# Patient Record
Sex: FEMALE | Race: BLACK OR AFRICAN AMERICAN | Employment: FULL TIME | URBAN - METROPOLITAN AREA
[De-identification: names, ages, dates, MRNs, and addresses within clinical notes are randomized per-mention and may not be internally consistent; named-entity substitution may affect disease eponyms.]

---

## 2021-05-30 ENCOUNTER — RECORDS - HEALTHEAST (OUTPATIENT)
Dept: ADMINISTRATIVE | Facility: CLINIC | Age: 36
End: 2021-05-30

## 2021-11-08 ENCOUNTER — TELEPHONE (OUTPATIENT)
Dept: EMERGENCY MEDICINE | Facility: CLINIC | Age: 36
End: 2021-11-08

## 2021-11-08 ENCOUNTER — HOSPITAL ENCOUNTER (EMERGENCY)
Facility: CLINIC | Age: 36
Discharge: HOME | End: 2021-11-08
Attending: EMERGENCY MEDICINE | Admitting: EMERGENCY MEDICINE
Payer: COMMERCIAL

## 2021-11-08 VITALS
OXYGEN SATURATION: 100 % | DIASTOLIC BLOOD PRESSURE: 88 MMHG | HEART RATE: 67 BPM | RESPIRATION RATE: 16 BRPM | TEMPERATURE: 96.8 F | SYSTOLIC BLOOD PRESSURE: 134 MMHG | WEIGHT: 195 LBS

## 2021-11-08 DIAGNOSIS — U07.1 ENCOUNTER BY TELEHEALTH FOR CONFIRMED SEVERE ACUTE RESPIRATORY SYNDROME CORONAVIRUS 2 (SARS-COV-2) INFECTION: ICD-10-CM

## 2021-11-08 DIAGNOSIS — T19.2XXA VAGINAL FOREIGN BODY, INITIAL ENCOUNTER: ICD-10-CM

## 2021-11-08 DIAGNOSIS — N93.9 VAGINAL BLEEDING: ICD-10-CM

## 2021-11-08 LAB
ALBUMIN UR-MCNC: 50 MG/DL
AMORPH CRY #/AREA URNS HPF: ABNORMAL /HPF
APPEARANCE UR: CLEAR
BILIRUB UR QL STRIP: NEGATIVE
CLUE CELLS: NORMAL
COLOR UR AUTO: ABNORMAL
FLUAV RNA SPEC QL NAA+PROBE: NEGATIVE
FLUBV RNA RESP QL NAA+PROBE: NEGATIVE
GLUCOSE UR STRIP-MCNC: NEGATIVE MG/DL
HCG UR QL: NEGATIVE
HGB UR QL STRIP: ABNORMAL
KETONES UR STRIP-MCNC: ABNORMAL MG/DL
LEUKOCYTE ESTERASE UR QL STRIP: NEGATIVE
NITRATE UR QL: NEGATIVE
PH UR STRIP: 6.5 [PH] (ref 5–7)
RBC URINE: 7 /HPF
RSV RNA SPEC NAA+PROBE: NEGATIVE
SARS-COV-2 RNA RESP QL NAA+PROBE: POSITIVE
SP GR UR STRIP: 1.03 (ref 1–1.03)
SQUAMOUS EPITHELIAL: 6 /HPF
TRICHOMONAS, WET PREP: NORMAL
UROBILINOGEN UR STRIP-MCNC: NORMAL MG/DL
WBC URINE: 3 /HPF
WBC'S/HIGH POWER FIELD, WET PREP: NORMAL
YEAST, WET PREP: NORMAL

## 2021-11-08 PROCEDURE — 87637 SARSCOV2&INF A&B&RSV AMP PRB: CPT | Performed by: EMERGENCY MEDICINE

## 2021-11-08 PROCEDURE — 81015 MICROSCOPIC EXAM OF URINE: CPT | Performed by: EMERGENCY MEDICINE

## 2021-11-08 PROCEDURE — 87210 SMEAR WET MOUNT SALINE/INK: CPT | Performed by: EMERGENCY MEDICINE

## 2021-11-08 PROCEDURE — 99284 EMERGENCY DEPT VISIT MOD MDM: CPT | Performed by: EMERGENCY MEDICINE

## 2021-11-08 PROCEDURE — 87491 CHLMYD TRACH DNA AMP PROBE: CPT | Performed by: EMERGENCY MEDICINE

## 2021-11-08 PROCEDURE — 87591 N.GONORRHOEAE DNA AMP PROB: CPT | Performed by: EMERGENCY MEDICINE

## 2021-11-08 PROCEDURE — C9803 HOPD COVID-19 SPEC COLLECT: HCPCS

## 2021-11-08 PROCEDURE — 99284 EMERGENCY DEPT VISIT MOD MDM: CPT

## 2021-11-08 PROCEDURE — 81025 URINE PREGNANCY TEST: CPT | Performed by: EMERGENCY MEDICINE

## 2021-11-08 ASSESSMENT — ENCOUNTER SYMPTOMS
DYSURIA: 0
ABDOMINAL PAIN: 1
FEVER: 0
CHILLS: 0

## 2021-11-08 NOTE — LETTER
November 9, 2021      Aby Carmona  [address] [facility] [address]          SARS CoV2 PCR (no units)   Date Value   11/08/2021 Positive (A)       This letter provides a written record that you were tested for COVID-19. Your result was positive. This means you have COVID-19 (coronavirus).    How can I protect others?If you have symptoms, stay home and away from others (self-isolate) until:You ve had no fever--and no medicine that reduces fever--for 1 full day (24 hours). And      Your other symptoms have gotten better. For example, your cough or breathing has improved. And     At least 10 days have passed since your symptoms started. (If you've been told by a doctor that you have a weak immune system, wait 20 days).    If you don t have symptoms: Stay home and away from others (self-isolate) until at least 10 days have passed since your first positive COVID-19 test. If you have a weak immune system, please self-isolate for 20 days.    During this time:    Stay in your own room, including for meals. Use your own bathroom if you can.    Stay away from others in your home. No hugging, kissing or shaking hands. No visitors.     Don t go to work, school or anywhere else.     Clean  high touch  surfaces often (doorknobs, counters, handles, etc.). Use a household cleaning spray or wipes. You ll find a full list on the EPA website at www.epa.gov/pesticide-registration/list-n-disinfectants-use-against-sars-cov-2.     Cover your mouth and nose with a mask or other face covering to avoid spreading germs.    Wash your hands and face often with soap and water.    Make a list of people you have been in close contact with recently, even if either of you wore a face covering.  o Start your list from 2 days before you became ill or had a positive test.  o Include anyone that was within 6 feet of you for a cumulative total of 15 minutes or more in 24 hours. (Example: if you sat next to Jatin for 5 minutes in the  morning and 10 minutes in the afternoon, then you were in close contact for 15 minutes total that day. Jatin would be added to your list.)        A public health worker will call or text you. It is important that you answer. They will ask you questions about possible exposures to COVID-19, such as people you have been in direct contact with and places you have visited.    Tell the people on your list that you have COVID-19; they should stay away from others for 14 days starting form the last time they were in contact with you (unless you are told something different from a public health worker).    Caregivers in these groups are at risk for severe illness due to COVID-19:  o People 65 years and older  o People who live in a nursing home or long-term care facility  o People with chronic disease (lung, heart, cancer, diabetes, kidney, liver, immunologic)  o People who have a weakened immune system, including those who:  - Are in cancer treatment  - Take medicine that weakens the immune system, such as corticosteroids  - Had a bone marrow or organ transplant  - Have an immune deficiency  - Have poorly controlled HIV or AIDS  - Are obese (body mass index of 40 or higher)  - Smoke regularly    Caregivers should wear gloves while washing dishes, handling laundry and cleaning bedrooms and bathrooms.    Wash and dry laundry with special caution. Don t shake dirty laundry, and use the warmest water setting you can.    If you have a weakened immune system, ask your doctor about other actions you should take.    For more tips, go to www.cdc.gov/coronavirus/2019-ncov/downloads/10Things.pdf.    You should not go back to work until you meet the guidelines above for ending your home isolation. You don't need to be retested for COVID-19 before going back to work- studies show that you won't spread the virus if it's been at least 10 days since your symptoms started (or 20 days, if you have a weak immune system).    Employers: This  document serves as formal notice of your employee s medical guidelines for going back to work. They must meet the above guidelines before going back to work in person.    How can I take care of myself?    1. Get lots of rest. Drink extra fluids (unless a doctor has told you not to).    2. Take Tylenol (acetaminophen) for fever or pain. If you have liver or kidney problems, ask your family doctor if it s okay to take Tylenol.     Take either:     650 mg (two 325 mg pills) every 4 to 6 hours, or     1,000 mg (two 500 mg pills) every 8 hours as needed.     Note: Don t take more than 3,000 mg in one day. Acetaminophen is found in many medicines (both prescribed and over-the-counter medicines). Read all labels to be sure you don t take too much.    For children, check the Tylenol bottle for the right dose (based on their age or weight).    3. If you have other health problems (like cancer, heart failure, an organ transplant or severe kidney disease): Call your specialty clinic if you don t feel better in the next 2 days.    4. Know when to call 911: Emergency warning signs include:    Trouble breathing or shortness of breath    Pain or pressure in the chest that doesn t go away    Feeling confused like you haven t felt before, or not being able to wake up    Bluish-colored lips or face    5. Sign up for [employer]. We know it s scary to hear that you have COVID-19. We want to track your symptoms to make sure you re okay over the next 2 weeks. Please look for an email from [employer]--this is a free, online program that we ll use to keep in touch. To sign up, follow the link in the email. Learn more at [email or web address].      Where can I get more information?     [facility]: [email or web address]    Coronavirus Basics: www.health.[email or web address]/diseases/coronavirus/basics.html    What to Do If You re Sick: www.cdc.gov/coronavirus/2019-ncov/about/steps-when-sick.html    Ending Home Isolation:  www.cdc.gov/coronavirus/2019-ncov/hcp/disposition-in-home-patients.html     Caring for Someone with COVID-19: www.cdc.gov/coronavirus/2019-ncov/if-you-are-sick/care-for-someone.html     [facility] clinical trials (COVID-19 research studies): [email or web address]-clinical-trials

## 2021-11-08 NOTE — DISCHARGE INSTRUCTIONS
Thank you for choosing [facility] for your care. It was a pleasure taking care of you today in our Emergency Department.     Please follow up with your OB provider in the next 2-3 days. However, if you have continued worsening symptoms such as increased bleeding, fever, worsening pain, please return to the emergency department.

## 2021-11-08 NOTE — ED TRIAGE NOTES
Patient is reporting having pelvic after having sex and started to have pain on Saturday and she noted today that the condom had been left behind which did com out today.  The patient noted that she was spotting Sunday and today and once the condom was out the spotting stopped.

## 2021-11-08 NOTE — ED PROVIDER NOTES
[place] EMERGENCY DEPARTMENT ([facility])  11/08/21   History     Chief Complaint   Patient presents with     Abdominal Pain     The history is provided by the patient and medical records.     Aby Carmona is a 36 year old female with history of GERD, prior H. pylori infection who presents with lower abdominal pain.  This pain radiates to her back.  She reports having pelvic pain after intercourse on Saturday.  She notes that the condom had been left behind but it came out today but she still endorses some pelvic pain.  But denies abdominal pain.  Denies dysuria, diarrhea, nausea, vomiting, fevers, or any other concerns.  She denies any pain with urination.  She reports that she only has 1 sexual partner.     She reports having a cold last Monday but she feels today that she is almost over it.  She is fully Covid vaccinated on 1/26/21.  She has not had any recent Covid test.  She denies any new medical problems.  She states that she is only on her anxiety medications.  She states that she does not have any allergies.  She denies any other discharge.  She denies any fever or chills.     Past Medical History  No past medical history on file.  No past surgical history on file.  No current outpatient medications on file.    No Known Allergies  Family History  No family history on file.  Social History   Social History     Tobacco Use     Smoking status: Not on file     Smokeless tobacco: Not on file   Substance Use Topics     Alcohol use: Not on file     Drug use: Not on file      Past medical history, past surgical history, medications, allergies, family history, and social history were reviewed with the patient. No additional pertinent items.       Review of Systems   Constitutional: Negative for chills and fever.   Gastrointestinal: Positive for abdominal pain.   Genitourinary: Negative for dysuria.   All other systems reviewed and are negative.    A complete review of systems was performed with  pertinent positives and negatives noted in the HPI, and all other systems negative.    Physical Exam   BP: 134/88  Pulse: 67  Temp: 96.8  F (36  C)  Resp: 16  Weight: 88.5 kg (195 lb)  SpO2: 100 %  Physical Exam  General: Alert, lying on the bed in NAD  Neuro: Awake alert; moving extremities x 4 face symterical  Eyes: sclera nonicteric conjunctiva clear  Mouth: mmm  Heart: RRR  Lungs:  CTA bilaterally  Abdomen:  soft  non tenderness to palpation no rebound; no guarding +BS  Pelvic: Normal appearing vaginal mucosa; small amount of blood in the vaginal vault; no cervical motion tenderness; no adenexal tenderness to palpation  Back: no CVA tenderness  Extremities: no pedal edema        ED Course     3:13 PM  The patient was seen and examined by Sandra Correa MD, in Room VTB.     Patient had absolutely no abdominal tenderness on exam, and no tenderness on pelvic exam.  No concerning discharge but had minimal blood present.  I did obtain chlamydia and gonorrhea swabs, and sent them for PCR.  Also wet prep, wet prep was unremarkable.  Urinalysis was unremarkable as well.  Patient will be contacted if chlamydia or gonorrhea come back positive, we discussed risks and benefits and not starting antibiotics at this time for abdominal pathology.     In regards to patient's cold symptoms, we did discuss risks and benefits and recommended the Covid test at this time.  Patient's Covid test came back positive.  I called patient with the results and answer any questions she had regarding return to work, quarantine, and follow-up with her friends who also having symptoms.  Patient is in agreement with plan.    Patient discharged in good condition with questions answered. She was given [facility] discharge instructions and follow-up recommendations. Patient will return to the ED if symptoms worsen or she develops any of the concerning signs/symptoms as outlined in the EPIC d/c instructions. Otherwise she will follow up in clinic as  recommended in the d/c instructions.            Results for orders placed or performed during the hospital encounter of 11/08/21   UA with Microscopic reflex to Culture     Status: Abnormal    Specimen: Urine, Midstream   Result Value Ref Range    Color Urine Orange (A) Colorless, Straw, Light Yellow, Yellow    Appearance Urine Clear Clear    Glucose Urine Negative Negative mg/dL    Bilirubin Urine Negative Negative    Ketones Urine Trace (A) Negative mg/dL    Specific Gravity Urine 1.035 1.003 - 1.035    Blood Urine Large (A) Negative    pH Urine 6.5 5.0 - 7.0    Protein Albumin Urine 50  (A) Negative mg/dL    Urobilinogen Urine Normal Normal, 2.0 mg/dL    Nitrite Urine Negative Negative    Leukocyte Esterase Urine Negative Negative    Amorphous Crystals Urine Few (A) None Seen /HPF    RBC Urine 7 (H) <=2 /HPF    WBC Urine 3 <=5 /HPF    Squamous Epithelials Urine 6 (H) <=1 /HPF    Narrative    Urine Culture not indicated   HCG qualitative urine     Status: Normal   Result Value Ref Range    hCG Urine Qualitative Negative Negative   Symptomatic Influenza A/B & SARS-CoV2 (COVID-19) Virus PCR Multiplex Nasopharyngeal     Status: Abnormal    Specimen: Nasopharyngeal; Swab   Result Value Ref Range    Influenza A target Negative Negative    Influenza B target Negative Negative    RSV target Negative Negative    SARS CoV2 PCR Positive (A) Negative    Narrative    Testing was performed using the Xpert Xpress CoV2/Flu/RSV Assay on the Cepheid GeneXpert Instrument. This test should be ordered for the detection of SARS-CoV-2 and influenza viruses in individuals who meet clinical and/or epidemiological criteria. Test performance is unknown in asymptomatic patients. This test is for in vitro diagnostic use under the FDA EUA for laboratories certified under CLIA to perform high or moderate complexity testing. This test has not been FDA cleared or approved. A negative result does not rule out the presence of PCR inhibitors in the  specimen or target RNA in concentration below the limit of detection for the assay. If only one viral target is positive but coinfection with multiple targets is suspected, the sample should be re-tested with another FDA cleared, approved, or authorized test, if coinfection would change clinical management. This test was validated by the [facility]. These laboratories are certified under the Clinical  Laboratory Improvement Amendments of 1988 (CLIA-88) as qualified to perform high complexity laboratory testing.   Wet prep     Status: Normal    Specimen: Vagina; Swab   Result Value Ref Range    Trichomonas Absent Absent    Yeast Absent Absent    Clue Cells Absent Absent    WBCs/high power field None None     Medications - No data to display     Assessments & Plan (with Medical Decision Making)   1. (T19.2XXA) Vaginal foreign body, initial encounter  2. (N93.9) Vaginal bleeding  3. COVID +  Discharge to home with close follow up in Primary care clinic  Return to the ER with any worsening symptoms      I have reviewed the nursing notes. I have reviewed the findings, diagnosis, plan and need for follow up with the patient.    There are no discharge medications for this patient.      Final diagnoses:   Vaginal foreign body, initial encounter   Vaginal bleeding     IBonny, am serving as a trained medical scribe to document services personally performed by Sandra Correa MD based on the provider's statements to me on November 8, 2021.  This document has been checked and approved by the attending provider.    ISandra MD, was physically present and have reviewed and verified the accuracy of this note documented by Bonny Murray, medical scribe.      Sandra Correa MD      --    [facility] EMERGENCY DEPARTMENT  11/8/2021     Sandra Correa MD  11/08/21 2015

## 2021-11-09 LAB
C TRACH DNA SPEC QL NAA+PROBE: NEGATIVE
N GONORRHOEA DNA SPEC QL NAA+PROBE: NEGATIVE

## 2021-11-09 NOTE — RESULT ENCOUNTER NOTE
Final result for both N. Gonorrhoeae PCR and Chlamydia Trachomatis PCR are NEGATIVE.  No treatment or change in treatment per [facility] ED Lab Result N. Gonorrhea AND/OR Chlamydia T. protocol.

## 2021-11-09 NOTE — TELEPHONE ENCOUNTER
"-Coronavirus (COVID-19) Notification    Pt acknowleded it may take 7-10d to receive copy of covid results in the mail. Verfied address.      Caller Name (Patient, parent, daughter/son, grandparent, etc)  Pt    Reason for call  Notify of Positive Coronavirus (COVID-19) lab results, assess symptoms,  review [facility] recommendations    Lab Result    Lab test:  2019-nCoV rRt-PCR or SARS-CoV-2 PCR    Oropharyngeal AND/OR nasopharyngeal swabs is POSITIVE for 2019-nCoV RNA/SARS-COV-2 PCR (COVID-19 virus)    RN Recommendations/Instructions per [facility] Coronavirus COVID-19 recommendations    Brief introduction script  Introduce self then review script:  \"I am calling on behalf of [employer].  We were notified that your Coronavirus test (COVID-19) for was POSITIVE for the virus.  I have some information to relay to you but first I wanted to mention that the [place] Dept of Health will be contacting you shortly [it's possible MD already called Patient] to talk to you more about how you are feeling and other people you have had contact with who might now also have the virus.  Also, [facility] is Partnering with the [facility] for Covid-19 research, you may be contacted directly by research staff.\"    Assessment (Inquire about Patient's current symptoms)   Assessment   Current Symptoms at time of phone call: (if no symptoms, document No symptoms] none   Symptoms onset (if applicable) 11/1     If at time of call, Patients symptoms hare worsened, the Patient should contact 911 or have someone drive them to Emergency Dept promptly:      If Patient calling 911, inform 911 personal that you have tested positive for the Coronavirus (COVID-19).  Place mask on and await 911 to arrive.    If Emergency Dept, If possible, please have another adult drive you to the Emergency Dept but you need to wear mask when in contact with other people.      Monoclonal Antibody Administration    You may be eligible to " "receive a new treatment with a monoclonal antibody for preventing hospitalization in patients at high risk for complications from COVID-19.   This medication is still experimental and available on a limited basis; it is given through an IV and must be given at an infusion center. Please note that not all people who are eligible will receive the medication since it is in limited supply.     Are you interested in being considered for this medication?  No.   Does the patient fit the criteria: No    If patient qualifies based on above criteria:  \"You will be contacted if you are selected to receive this treatment in the next 1-2 business days.   This is time sensitive and if you are not selected in the next 1-2 business days, you will not receive the medication.  If you do not receive a call to schedule, you have not been selected.\"      Review information with Patient    Your result was positive. This means you have COVID-19 (coronavirus).  We have sent you a letter that reviews the information that I'll be reviewing with you now.    How can I protect others?    If you have symptoms: stay home and away from others (self-isolate) until:    You've had no fever--and no medicine that reduces fever--for 1 full day (24 hours). And       Your other symptoms have gotten better. For example, your cough or breathing has improved. And     At least 10 days have passed since your symptoms started. (If you've been told by a doctor that you have a weak immune system, wait 20 days.)     If you don't have symptoms: Stay home and away from others (self-isolate) until at least 10 days have passed since your first positive COVID-19 test. (Date test collected)    During this time:    Stay in your own room, including for meals. Use your own bathroom if you can.    Stay away from others in your home. No hugging, kissing or shaking hands. No visitors.     Don't go to work, school or anywhere else.     Clean  high touch  surfaces often " (doorknobs, counters, handles, etc.). Use a household cleaning spray or wipes. You'll find a full list on the EPA website at www.epa.gov/pesticide-registration/list-n-disinfectants-use-against-sars-cov-2.     Cover your mouth and nose with a mask, tissue or other face covering to avoid spreading germs.    Wash your hands and face often with soap and water.    Make a list of people you have been in close contact with recently, even if either of you wore a face covering.   ; Start your list from 2 days before you became ill or had a positive test.  ; Include anyone that was within 6 feet of you for a cumulative total of 15 minutes or more in 24 hours. (Example: if you sat next to Jatin for 5 minutes in the morning and 10 minutes in the afternoon, then you were in close contact for 15 minutes total that day. Jatin would be added to your list.)    A public health worker will call or text you. It is important that you answer. They will ask you questions about possible exposures to COVID-19, such as people you have been in direct contact with and places you have visited.    Tell the people on your list that you have COVID-19; they should stay away from others for 14 days starting from the last time they were in contact with you (unless you are told something different from a public health worker).     Caregivers in these groups are at risk for severe illness due to COVID-19:  o People 65 years and older  o People who live in a nursing home or long-term care facility  o People with chronic disease (lung, heart, cancer, diabetes, kidney, liver, immunologic)  o People who have a weakened immune system, including those who:  - Are in cancer treatment  - Take medicine that weakens the immune system, such as corticosteroids  - Had a bone marrow or organ transplant  - Have an immune deficiency  - Have poorly controlled HIV or AIDS  - Are obese (body mass index of 40 or higher)  - Smoke regularly    Caregivers should wear gloves  while washing dishes, handling laundry and cleaning bedrooms and bathrooms.    Wash and dry laundry with special caution. Don't shake dirty laundry, and use the warmest water setting you can.    If you have a weakened immune system, ask your doctor about other actions you should take.    For more tips, go to www.cdc.gov/coronavirus/2019-ncov/downloads/10Things.pdf.    You should not go back to work until you meet the guidelines above for ending your home isolation. You don't need to be retested for COVID-19 before going back to work--studies show that you won't spread the virus if it's been at least 10 days since your symptoms started (or 20 days, if you have a weak immune system).    Employers: This document serves as formal notice of your employee's medical guidelines for going back to work. They must meet the above guidelines before going back to work in person.    How can I take care of myself?    1. Get lots of rest. Drink extra fluids (unless a doctor has told you not to).    2. Take Tylenol (acetaminophen) for fever or pain. If you have liver or kidney problems, ask your family doctor if it's okay to take Tylenol.     Take either:     650 mg (two 325 mg pills) every 4 to 6 hours, or     1,000 mg (two 500 mg pills) every 8 hours as needed.     Note: Don't take more than 3,000 mg in one day. Acetaminophen is found in many medicines (both prescribed and over-the-counter medicines). Read all labels to be sure you don't take too much.    For children, check the Tylenol bottle for the right dose (based on their age or weight).    3. If you have other health problems (like cancer, heart failure, an organ transplant or severe kidney disease): Call your specialty clinic if you don't feel better in the next 2 days.    4. Know when to call 911: Emergency warning signs include:    Trouble breathing or shortness of breath    Pain or pressure in the chest that doesn't go away    Feeling confused like you haven't felt  before, or not being able to wake up    Bluish-colored lips or face    5. Sign up for [employer]. We know it's scary to hear that you have COVID-19. We want to track your symptoms to make sure you're okay over the next 2 weeks. Please look for an email from [employer]--this is a free, online program that we'll use to keep in touch. To sign up, follow the link in the email. Learn more at [email or web address].    Where can I get more information?    [facility]: [email or web address]    Coronavirus Basics: www.health.[facility].[place]/diseases/coronavirus/basics.html    What to Do If You're Sick: www.cdc.gov/coronavirus/2019-ncov/about/steps-when-sick.html    Ending Home Isolation: www.cdc.gov/coronavirus/2019-ncov/hcp/disposition-in-home-patients.html     Caring for Someone with COVID-19: www.cdc.gov/coronavirus/2019-ncov/if-you-are-sick/care-for-someone.html     [facility] clinical trials (COVID-19 research studies): [email or web address].[facility]-clinical-trials     A Positive COVID-19 letter will be sent via [employer] or the mail. (Exception, no letters sent to Presurgerical/Preprocedure Patients)    Stephanie Cummings

## 2021-11-09 NOTE — RESULT ENCOUNTER NOTE
Negative for Influenza A, Influenza B, and RSV.  Positive Covid19 result, the resuls is managed for the LPN Support Team and the patient will be notified of their positive Covid19 result via [employer] (if active) or they will be contacted by the nurse via phone call if not active on Mychart.  A letter is sent to patient via [employer] or via mail (if no Mychart).

## 2021-11-22 ENCOUNTER — HOSPITAL ENCOUNTER (EMERGENCY)
Facility: CLINIC | Age: 36
Discharge: LEFT BEFORE DISCHARGE | End: 2021-11-22
Payer: COMMERCIAL

## 2021-11-22 VITALS
OXYGEN SATURATION: 100 % | DIASTOLIC BLOOD PRESSURE: 84 MMHG | RESPIRATION RATE: 18 BRPM | HEART RATE: 85 BPM | TEMPERATURE: 98.4 F | SYSTOLIC BLOOD PRESSURE: 131 MMHG

## 2021-11-22 NOTE — ED TRIAGE NOTES
Pt presents to triage with runny nose, sneezing, pressure in sinuses. Pt was diagnosed with Covid-19 on 11/8 and states these symptoms began yesterday, 11/21. Pt is concerned that she should not return to school until these resolve. VSS.    Marisol Shaffer RN on 11/22/2021 at 9:03 AM

## 2021-12-11 ENCOUNTER — HEALTH MAINTENANCE LETTER (OUTPATIENT)
Age: 36
End: 2021-12-11

## 2022-09-25 ENCOUNTER — HEALTH MAINTENANCE LETTER (OUTPATIENT)
Age: 37
End: 2022-09-25

## 2023-02-04 ENCOUNTER — HEALTH MAINTENANCE LETTER (OUTPATIENT)
Age: 38
End: 2023-02-04

## 2024-02-27 ENCOUNTER — VIRTUAL VISIT (OUTPATIENT)
Dept: PSYCHOLOGY | Facility: CLINIC | Age: 39
End: 2024-02-27

## 2024-02-27 DIAGNOSIS — F41.1 GAD (GENERALIZED ANXIETY DISORDER): Primary | ICD-10-CM

## 2024-02-27 PROCEDURE — 90791 PSYCH DIAGNOSTIC EVALUATION: CPT | Mod: 95 | Performed by: MARRIAGE & FAMILY THERAPIST

## 2024-02-27 NOTE — PROGRESS NOTES
[facility]      PATIENT'S NAME: Aby Carmona  PREFERRED NAME: Aby Carmona  PRONOUNS:       MRN: [id number]  : 1985  ADDRESS: [address] [place], [place] [address]T. NUMBER:  [id number]  DATE OF SERVICE: 24  START TIME: 2:01p  END TIME: 2:32p  PREFERRED PHONE: [phone]    SERVICE MODALITY:  Video Visit:      Provider verified identity through the following two step process.  Patient provided:  Patient     Telemedicine Visit: The patient's condition can be safely assessed and treated via synchronous audio and visual telemedicine encounter.      Reason for Telemedicine Visit: Services only offered telehealth    Originating Site (Patient Location): Patient's home    Distant Site (Provider Location): Provider Remote Setting- Home Office    Consent:  The patient/guardian has verbally consented to: the potential risks and benefits of telemedicine (video visit) versus in person care; bill my insurance or make self-payment for services provided; and responsibility for payment of non-covered services.     Patient would like the video invitation sent by:  My Chart    Mode of Communication:  Video Conference via [facility]    Distant Location (Provider):  Off-site    As the provider I attest to compliance with applicable laws and regulations related to telemedicine.    UNIVERSAL ADULT Mental Health DIAGNOSTIC ASSESSMENT    Identifying Information:  Patient is a 38 year old,    individual.  Patient was referred for an assessment by self .  Patient attended the session alone.    Chief Complaint:   Pt first time in MH services. PT has been having ongoing autoimmune issues which may be caused by stress. Recently graduated from grad school with a MA in Dental Therapy  -In 2019 lost  of 16 years, then COVID happened and lost of isolation. Pt started a dual degree program which took up most of her time  -Now that she is completed with her program and  thinking more often, feeling like she was traumatized as a child from parents and that her upbringing was poor. Noticing that while now in the dating world, her behaviors are stemming from her upbringing. Felt that she was always on the go with school, work and trying to be perfect in order to gain love and approval from family/parents.     Social/Family History:    Patient reported had no significant delays in developmental tasks.   Patient's highest education level was graduate school. Patient identified the following learning problems: none reported.  Modifications will not be used to assist communication in therapy.   Patient reports they are  able to understand written materials.    Patient's current relationship status is .    Pt has wanted to start dating but is struggling with this process and likely chasing the wrong type of people.      Patient identified their sexual orientation as heterosexual.  Patient reported having zero child(silvio). Patient identified friends and neighbor  as part of their support system.  Patient identified the quality of these relationships as good.     Patient's current living/housing situation involves staying in own home/apartment.  They live with self and they report that housing is stable.     Patient is currently employed full time and reports they are able to function appropriately at work.. 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 The issues are causing a slow down with speed and ability to multitask.   Patient reports their finances are obtained through employment.  Patient does identify finances as a current stressor.      Patient reported that they have not been involved with the legal system.   Patient denies being on probation / parole / under the jurisdiction of the court.    Patient's Strengths and Limitations:  Patient identified the following strengths or resources that will help them succeed in  treatment: commitment to health and well being and insight. Things that may interfere with the patient's success in treatment include: few friends, lack of family support, lack of social support, and physical health concerns.     Personal and Family Medical History:  Patient does not report a family history of mental health concerns.  Patient reports family history is not on file..     Patient does report Mental Health Diagnosis and/or Treatment.  Patient Patient reported the following previous diagnoses which include(s): none reported. Patient has received mental health services in the past:  couples counseling 1x and only 1 session of individual therapy .  Psychiatric Hospitalizations: None.  Patient denies a history of civil commitment.  Patient is not receiving other mental health services.  These include none.       Patient has had a physical exam to rule out medical causes for current symptoms.  Date of last physical exam was within the past year. Client was encouraged to follow up with PCP if symptoms were to develop. The patient has a [facility] Provider, who is named Jaylin Moya.  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   PT has done multiple tests and blood work to diagnose the issue with no success. Will be seeing a rheumatologist soon however pt feels that her stress is what is making things worse.   -Noticing muscle tension, rashes, vision issues, headaches      Patient reports pain concerns including whole body pain.  Patient does want help addressing pain concerns..   There are significant appetite / nutritional concerns / weight changes. Pt has dropped 30 lbs since Aug 2023 but feels she is eating correctly overall  These may include: loss of appetite. Patient reports the following sleep concerns:  Averaging only around 3-4 hrs per night, struggle with falling and staying asleep, anxiety response causing to stay away.  .    Patient does not report a history of head injury / trauma / cognitive impairment.      Patient reports not taking any current medications    Patient Allergies:  No Known Allergies    Medical History:  No past medical history on file.      Current Mental Status Exam:   Appearance:  Appropriate    Eye Contact:  Good   Psychomotor:  Normal       Gait / station:  no problem  Attitude / Demeanor: Cooperative   Speech      Rate / Production: Normal/ Responsive      Volume:  Normal  volume      Language:  intact  Mood:   Anxious   Affect:   Appropriate    Thought Content: Clear   Thought Process: Coherent       Associations: No loosening of associations  Insight:   Good   Judgment:  Intact   Orientation:  Person  Attention/concentration: Good    Substance Use:   Patient did not report a family history of substance use concerns; see medical history section for details.  Patient has not received chemical dependency treatment in the past.  Patient has not ever been to detox.    Safety Assessment:   Patient denies current homicidal ideation and behaviors.  Patient denies current self-injurious ideation and behaviors.    Patient denied risk behaviors associated with substance use.   Patient denies any high risk behaviors associated with mental health symptoms.  Patient reports the following current concerns for their personal safety: None.  Patient reports there   firearms in the house.       There are no firearms in the home..    History of Safety Concerns:  Patient denied a history of homicidal ideation.     Patient denied a history of personal safety concerns.    Patient denied a history of assaultive behaviors.    Patient denied a history of sexual assault behaviors.     Patient denied a history of risk behaviors associated with substance use.  Patient denies any history of high risk behaviors associated with mental health symptoms.  Patient reports the following protective factors:      Risk Plan:  See Recommendations  for Safety and Risk Management Plan    Review of Symptoms per patient report:   Depression: Change in sleep, Lack of interest, Change in energy level, Feelings of hopelessness, Feelings of helplessness, Withdrawn, and Poor hygeine  Stacey:  No Symptoms  Psychosis: No Symptoms  Anxiety: Excessive worry, Nervousness, Sleep disturbance, Ruminations, Poor concentration, and Irritability    Diagnostic Criteria:   Generalized Anxiety Disorder  A. Excessive anxiety and worry about a number of events or activities (such as work or school performance).   B. The person finds it difficult to control the worry.  C. Select 3 or more symptoms (required for diagnosis). Only one item is required in children.   - Restlessness or feeling keyed up or on edge.    - Being easily fatigued.    - Difficulty concentrating or mind going blank.    - Irritability.    - Muscle tension.    - Sleep disturbance (difficulty falling or staying asleep, or restless unsatisfying sleep).     Functional Status:  Patient reports the following functional impairments:  health maintenance and work / vocational responsibilities.         Clinical Summary:  1. Psychosocial, Cultural and Contextual Factors: Ongoing MH issues  .  2. Principal DSM5 Diagnoses  (Sustained by DSM5 Criteria Listed Above):   300.02 (F41.1) Generalized Anxiety Disorder.    5. Prognosis: Expect Improvement.  6. Likely consequences of symptoms if not treated: .  7. Client strengths include:  has a previous history of therapy .     Recommendations:     1. Plan for Safety and Risk Management:   Safety and Risk: Recommended that patient call 911 or go to the local ED should there be a change in any of these risk factors..          Report to child / adult protection services was NA.     8. Records:   These were reviewed at time of assessment.   Information in this assessment was obtained from the medical record and  provided by patient who is a good historian.    Patient will have open access  to their mental health medical record.    9.   Interactive Complexity: No      Provider Name/ Credentials:  JD Mosquera  February 27, 2024

## 2024-02-29 PROBLEM — F41.1 GAD (GENERALIZED ANXIETY DISORDER): Status: ACTIVE | Noted: 2024-02-29

## 2024-03-02 ENCOUNTER — HEALTH MAINTENANCE LETTER (OUTPATIENT)
Age: 39
End: 2024-03-02

## 2024-06-22 ENCOUNTER — HOSPITAL ENCOUNTER (EMERGENCY)
Facility: CLINIC | Age: 39
Discharge: HOME | End: 2024-06-22
Attending: EMERGENCY MEDICINE | Admitting: EMERGENCY MEDICINE
Payer: COMMERCIAL

## 2024-06-22 ENCOUNTER — APPOINTMENT (OUTPATIENT)
Dept: ULTRASOUND IMAGING | Facility: CLINIC | Age: 39
End: 2024-06-22
Payer: COMMERCIAL

## 2024-06-22 VITALS
BODY MASS INDEX: 34.55 KG/M2 | WEIGHT: 176 LBS | RESPIRATION RATE: 20 BRPM | DIASTOLIC BLOOD PRESSURE: 57 MMHG | SYSTOLIC BLOOD PRESSURE: 115 MMHG | HEIGHT: 60 IN | HEART RATE: 70 BPM | OXYGEN SATURATION: 100 % | TEMPERATURE: 97.8 F

## 2024-06-22 DIAGNOSIS — B96.89 BACTERIAL VAGINOSIS: ICD-10-CM

## 2024-06-22 DIAGNOSIS — R42 LIGHTHEADED: ICD-10-CM

## 2024-06-22 DIAGNOSIS — R06.02 SHORTNESS OF BREATH: ICD-10-CM

## 2024-06-22 DIAGNOSIS — N76.0 BACTERIAL VAGINOSIS: ICD-10-CM

## 2024-06-22 LAB
ABO/RH(D): NORMAL
ALBUMIN UR-MCNC: NEGATIVE MG/DL
ANION GAP SERPL CALCULATED.3IONS-SCNC: 7 MMOL/L (ref 7–15)
ANTIBODY SCREEN: NEGATIVE
APPEARANCE UR: CLEAR
ATRIAL RATE - MUSE: 74 BPM
BASOPHILS # BLD AUTO: 0.1 10E3/UL (ref 0–0.2)
BASOPHILS NFR BLD AUTO: 1 %
BILIRUB UR QL STRIP: NEGATIVE
BUN SERPL-MCNC: 11.4 MG/DL (ref 6–20)
CALCIUM SERPL-MCNC: 9.2 MG/DL (ref 8.6–10)
CHLORIDE SERPL-SCNC: 103 MMOL/L (ref 98–107)
CLUE CELLS: PRESENT
COLOR UR AUTO: ABNORMAL
CREAT SERPL-MCNC: 0.75 MG/DL (ref 0.51–0.95)
DEPRECATED HCO3 PLAS-SCNC: 26 MMOL/L (ref 22–29)
DIASTOLIC BLOOD PRESSURE - MUSE: NORMAL MMHG
EGFRCR SERPLBLD CKD-EPI 2021: >90 ML/MIN/1.73M2
EOSINOPHIL # BLD AUTO: 0.1 10E3/UL (ref 0–0.7)
EOSINOPHIL NFR BLD AUTO: 1 %
ERYTHROCYTE [DISTWIDTH] IN BLOOD BY AUTOMATED COUNT: 13.7 % (ref 10–15)
GLUCOSE SERPL-MCNC: 87 MG/DL (ref 70–99)
GLUCOSE UR STRIP-MCNC: NEGATIVE MG/DL
HCG INTACT+B SERPL-ACNC: ABNORMAL MIU/ML
HCT VFR BLD AUTO: 34.5 % (ref 35–47)
HGB BLD-MCNC: 11 G/DL (ref 11.7–15.7)
HGB UR QL STRIP: NEGATIVE
IMM GRANULOCYTES # BLD: 0.1 10E3/UL
IMM GRANULOCYTES NFR BLD: 1 %
INTERPRETATION ECG - MUSE: NORMAL
KETONES UR STRIP-MCNC: NEGATIVE MG/DL
LEUKOCYTE ESTERASE UR QL STRIP: NEGATIVE
LYMPHOCYTES # BLD AUTO: 3.2 10E3/UL (ref 0.8–5.3)
LYMPHOCYTES NFR BLD AUTO: 29 %
MAGNESIUM SERPL-MCNC: 2.1 MG/DL (ref 1.7–2.3)
MCH RBC QN AUTO: 27 PG (ref 26.5–33)
MCHC RBC AUTO-ENTMCNC: 31.9 G/DL (ref 31.5–36.5)
MCV RBC AUTO: 85 FL (ref 78–100)
MONOCYTES # BLD AUTO: 0.8 10E3/UL (ref 0–1.3)
MONOCYTES NFR BLD AUTO: 7 %
MUCOUS THREADS #/AREA URNS LPF: PRESENT /LPF
NEUTROPHILS # BLD AUTO: 6.7 10E3/UL (ref 1.6–8.3)
NEUTROPHILS NFR BLD AUTO: 61 %
NITRATE UR QL: NEGATIVE
NRBC # BLD AUTO: 0 10E3/UL
NRBC BLD AUTO-RTO: 0 /100
P AXIS - MUSE: 19 DEGREES
PH UR STRIP: 6.5 [PH] (ref 5–7)
PLATELET # BLD AUTO: 470 10E3/UL (ref 150–450)
POTASSIUM SERPL-SCNC: 3.7 MMOL/L (ref 3.4–5.3)
PR INTERVAL - MUSE: 130 MS
QRS DURATION - MUSE: 80 MS
QT - MUSE: 370 MS
QTC - MUSE: 410 MS
R AXIS - MUSE: 15 DEGREES
RBC # BLD AUTO: 4.08 10E6/UL (ref 3.8–5.2)
RBC URINE: 0 /HPF
SODIUM SERPL-SCNC: 136 MMOL/L (ref 135–145)
SP GR UR STRIP: 1.02 (ref 1–1.03)
SPECIMEN EXPIRATION DATE: NORMAL
SQUAMOUS EPITHELIAL: <1 /HPF
SYSTOLIC BLOOD PRESSURE - MUSE: NORMAL MMHG
T AXIS - MUSE: 7 DEGREES
TRICHOMONAS, WET PREP: ABNORMAL
UROBILINOGEN UR STRIP-MCNC: <2 MG/DL
VENTRICULAR RATE- MUSE: 74 BPM
WBC # BLD AUTO: 10.9 10E3/UL (ref 4–11)
WBC URINE: <1 /HPF
WBC'S/HIGH POWER FIELD, WET PREP: ABNORMAL
YEAST, WET PREP: ABNORMAL

## 2024-06-22 PROCEDURE — 87491 CHLMYD TRACH DNA AMP PROBE: CPT

## 2024-06-22 PROCEDURE — 87210 SMEAR WET MOUNT SALINE/INK: CPT

## 2024-06-22 PROCEDURE — 80048 BASIC METABOLIC PNL TOTAL CA: CPT

## 2024-06-22 PROCEDURE — 86900 BLOOD TYPING SEROLOGIC ABO: CPT

## 2024-06-22 PROCEDURE — 85025 COMPLETE CBC W/AUTO DIFF WBC: CPT

## 2024-06-22 PROCEDURE — 250N000013 HC RX MED GY IP 250 OP 250 PS 637

## 2024-06-22 PROCEDURE — 93005 ELECTROCARDIOGRAM TRACING: CPT

## 2024-06-22 PROCEDURE — 81001 URINALYSIS AUTO W/SCOPE: CPT

## 2024-06-22 PROCEDURE — 36415 COLL VENOUS BLD VENIPUNCTURE: CPT

## 2024-06-22 PROCEDURE — 99285 EMERGENCY DEPT VISIT HI MDM: CPT | Mod: 25

## 2024-06-22 PROCEDURE — 76801 OB US < 14 WKS SINGLE FETUS: CPT

## 2024-06-22 PROCEDURE — 83735 ASSAY OF MAGNESIUM: CPT

## 2024-06-22 PROCEDURE — 84702 CHORIONIC GONADOTROPIN TEST: CPT

## 2024-06-22 RX ORDER — METRONIDAZOLE 500 MG/1
500 TABLET ORAL 2 TIMES DAILY
Qty: 13 TABLET | Refills: 0 | Status: SHIPPED | OUTPATIENT
Start: 2024-06-22 | End: 2024-06-29

## 2024-06-22 RX ORDER — METRONIDAZOLE 500 MG/1
500 TABLET ORAL ONCE
Status: COMPLETED | OUTPATIENT
Start: 2024-06-22 | End: 2024-06-22

## 2024-06-22 RX ADMIN — METRONIDAZOLE 500 MG: 500 TABLET, FILM COATED ORAL at 22:16

## 2024-06-22 ASSESSMENT — ACTIVITIES OF DAILY LIVING (ADL)
ADLS_ACUITY_SCORE: 35

## 2024-06-22 ASSESSMENT — ENCOUNTER SYMPTOMS
SHORTNESS OF BREATH: 1
LIGHT-HEADEDNESS: 1
FEVER: 0
COUGH: 0
ABDOMINAL PAIN: 0

## 2024-06-22 ASSESSMENT — COLUMBIA-SUICIDE SEVERITY RATING SCALE - C-SSRS
2. HAVE YOU ACTUALLY HAD ANY THOUGHTS OF KILLING YOURSELF IN THE PAST MONTH?: NO
1. IN THE PAST MONTH, HAVE YOU WISHED YOU WERE DEAD OR WISHED YOU COULD GO TO SLEEP AND NOT WAKE UP?: NO
6. HAVE YOU EVER DONE ANYTHING, STARTED TO DO ANYTHING, OR PREPARED TO DO ANYTHING TO END YOUR LIFE?: NO

## 2024-06-22 NOTE — ED PROVIDER NOTES
EMERGENCY DEPARTMENT ENCOUNTER      NAME: Aby Carmona  AGE: 38 year old female  YOB: 1985  MRN: [id number]  EVALUATION DATE & TIME: 06/22/24 4:03 PM    PCP: Jaylin Moya    ED PROVIDER: Misa Callahan PA-C      CHIEF COMPLAINT:  Vaginal Bleeding - Pregnant      FINAL IMPRESSION:  1. Lightheaded    2. Shortness of breath    3. Bacterial vaginosis          ED COURSE & MEDICAL DECISION MAKING:  Pertinent Labs & Imaging studies reviewed. (See chart for details)    The patient is a 38 year old-year-old female with a history of benign thrombophilia, fibromyalgia diagnosed at [facility], gout, and inflammatory arthritis previously treated by [facility] with Plaquenil per chart review presenting to the emergency department for evaluation of intermittent lower abdominal cramping for the past 4 days as well as feeling more lightheaded today with some shortness of breath. Symptoms have largely subsided at this time. She was initially seen in [facility] and sent to the emergency department for further evaluation, specifically for concern for PE.    Initial vital signs reviewed a diarrhea all within normal limits. SpO2 100% on room air. Heart with regular rate and rhythm, lungs clear to auscultation throughout. Abdomen is soft, non distended with mild suprapubic tenderness, no rebound or guarding to suggest surgical abdomen.  No unilateral lower extremity edema.     I considered ectopic pregnancy, viral upper or lower respiratory infection, arrhythmia, anemia. No trauma or known blebs to suggest pneumothorax. No CP or history to suggest atypical presentation of MI.  Low clinical suspicion for pulmonary edema/congestive heart failure. No fevers or significant sputum production to suggest pneumonia. Presentation not consistent with PE (no history of blood clots, malignancy, or immobilization, no complaint of pleuritic chest pain, no hypoxia and no unexplained tachycardia). She is PERC  negative. Using shared decision making, discussed CT imaging, which the patient declines which I do feel is reasonable with radiation risk to baby, SpO2 98% at simone on room air, and that she is PERC negative with symptoms currently largely resolved.    Symptoms and work up most consistent with bacterial vaginosis, lightheadedness, and shortness of breath. OB ultrasound imaging revealed single IUP 5w6d without identified heart beat, likely secondary to gestational age. Follow up with OBGYN recommended. CBC without leukocytosis, hemoglobin stable at 11, was 11.1 most recent measurement 12/18/23. She is Rh+, rhogam not indicated.  BMP is without evidence of acute kidney injury or emergent electrolyte abnormality. UA not suggestive of infection. Wet prep positive for BV.  I considered, but think unlikely, hepatobiliary disease, urinary tract infection/pyelonephritis (UA not suggestive of infection), renal calculi, intestinal ischemia (no significant risk factors, no rebound or guarding), aortic dissection (pain is not sharp, hemodynamically stable 4 days out from onset, no known aortic pathology),. ECG obtained which shows Normal sinus rhythm.     Discussed plan for discharge to home with prescription for metronidazole, close outpatient follow up with primary care clinician and OBGYN. The patient verbalized understanding and is comfortable with this plan. Symptomatic home cares discussed. Emphasized importance of follow up with primary care clinician. Strict return precautions discussed.     At the conclusion of the encounter I discussed the results of all of the tests and the disposition. The questions were answered. The patient or family acknowledged understanding and was agreeable with the care plan.       ED COURSE:  4:30 PM  I met and introduced myself to the patient. I gathered initial history and performed an initial physical exam. We discussed options and plan for diagnostics and treatment here in the ED.  4:35  PM  I have staffed the patient with Dr. Thomas, ED physician, who has evaluated the patient and agrees with all aspects of today's care.   9:11 PM  I rechecked and updated the patient.   9:38 PM I did a pelvic exam on the patient with nurse chaperone present.  White physiologic discharge present in vaginal vault, no active vaginal bleeding.  Cervical os is closed.  No cervical motion tenderness.  Wet prep and GC chlamydia collected.  10:24 PM  I discussed the plan for discharge with the patient, and patient is agreeable. We discussed supportive cares at home and reasons for return to the ER including new or worsening symptoms - all questions and concerns addressed to the best of my ability. Strict return precautions discussed. Patient to be discharged by RN.      Medical Decision Making  Obtained supplemental history:Supplemental history obtained?: No  Reviewed external records: External records reviewed?: Documented in chart and Outpatient Record: Urgent care visit 6/22/2024, rheumatology visit 6/10/2024  Care impacted by chronic illness:Chronic Pain  Care significantly affected by social determinants of health:N/A  Did you consider but not order tests?: Work up considered but not performed and documented in chart, if applicable  Did you interpret images independently?: Independent interpretation of ECG and images noted in documentation, when applicable.  Consultation discussion with other provider:Did you involve another provider (consultant, MH, pharmacy, etc.)?: No  Discharge. I prescribed additional prescription strength medication(s) as charted. See documentation for any additional details.      CRITICAL CARE:  Not applicable      MEDICATIONS GIVEN IN THE EMERGENCY:  Medications   metroNIDAZOLE (FLAGYL) tablet 500 mg (500 mg Oral $Given 6/22/24 9873)       NEW PRESCRIPTIONS STARTED AT TODAY'S ER VISIT  Discharge Medication List as of 6/22/2024 10:19 PM        START taking these medications    Details    metroNIDAZOLE (FLAGYL) 500 MG tablet Take 1 tablet (500 mg) by mouth 2 times daily for 13 doses, Disp-13 tablet, R-0, Local PrintEat yogurt or cottage cheese daily to prevent diarrhea that can be caused by taking this medication.                =================================================================    HPI    Patient information was obtained from: the patient     Use of Intrepreter: N/A         Aby Carmona is a 38 year old female with pertinent medical history of benign thrombophilia, fibromyalgia diagnosed at [facility], gout, and inflammatory arthritis previously treated by [facility] with Plaquenil who presents to the emergency department for evaluation of abdominal cramping, shortness of breath, lightheadedness in pregnancy.    The patient reports intermittent lower abdominal cramping initially onset a few days ago. She developed lightheaded dizziness and shortness of breath today that was especially noticeable with going up stairs. She had a positive home pregnancy test 4 days ago and is unsure when her last menstrual period was as she had been experiencing irregular menstrual periods prior to this. She denies fevers, chest pain, syncope, vomiting, melena, urinary symptoms, vaginal bleeding. The patient has a remote history of what was noted on her chart as a benign thrombophilia. She has no history of DVT or PE. No recent prolonged immobilization, hemoptysis, unilateral leg swelling, is not on supplemental estrogen therapy.  The patient has been pregnant before and shares that she experienced similar lightheaded dizziness and breathing changes which improved after she underwent iron infusions..     Per chart review, patient was seen at [facility] urgent care earlier today (6/22/2024) for dizziness, shortness of breath with exertion, with intermittent lower abdominal cramping for the past few days.  She did positive home pregnancy test 4 days ago, unsure of LMP.  It was recommended  that she present to the emergency department for further evaluation with concern for possible PE.    Per chart review, patient was seen by rheumatology at [facility] on 6/10/2024 for history of elevated inflammatory markers.  Joint exam done by the physician that day was normal.  Noted that serologic tests within normal, patient's had elevated ESR and CRP's.  Assessment was widespread at with urology on, noted that historically this has been called fibromyalgia previously.  Recommended discontinue ibuprofen and starting naproxen.      PAST MEDICAL HISTORY:  No past medical history on file.    PAST SURGICAL HISTORY:  No past surgical history on file.    CURRENT MEDICATIONS:    None       ALLERGIES:  No Known Allergies    FAMILY HISTORY:  No family history on file.    SOCIAL HISTORY:        VITALS:    First Vitals:  No data found.      No data found.        PHYSICAL EXAM  VITAL SIGNS: /57   Pulse 70   Temp 97.8  F (36.6  C) (Oral)   Resp 20   Ht 1.524 m (5')   Wt 79.8 kg (176 lb)   SpO2 100%   BMI 34.37 kg/m     GENERAL: Awake, alert, answering questions appropriately, in no acute distress.  HEENT: Moist mucous membranes.  SPEECH:  Easy to understand speech, Normal volume and sammy. Normal phonation.  PULMONARY: No respiratory distress, Breathing comfortably on room air. Lungs clear to auscultation bilaterally.  CARDIOVASCULAR: Regular rate and rhythm, radial pulses present, symmetric, and normal.  ABDOMINAL: Soft, Nondistended, mild suprapubic tenderness, No rebound or guarding, No palpable masses.  BACK: No CVA tenderness.  PELVIC: External genitalia with no lesions noted. White physiologic discharge in vaginal vault. No active bleeding in vaginal vault. Cervix pink with closed external os. No cervical motion tenderness.   EXTREMITIES: Extremities are warm and well perfused. No lower extremity edema.  NEUROLOGIC: Moving all extremities spontaneously.   SKIN: Exposed areas of skin warm, dry, no  rashes.  PSYCH: Normal mood and affect.           RADIOLOGY/LAB:  Reviewed all pertinent imaging. Please see official radiology report.  All pertinent labs reviewed and interpreted.  Results for orders placed or performed during the hospital encounter of 06/22/24   US OB <14 Weeks W Transvaginal    Impression    IMPRESSION:   1.  Single intrauterine gestational sac with estimated gestational age of 5 weeks 6 days. Heart activity is not detected, likely related to the early gestational age. Recommend repeat ultrasound examination in one week to confirm fetal viability.  2.  Presumed small corpus luteum cyst right ovary.  3.  Multiple uterine fibroids.       Basic metabolic panel   Result Value Ref Range    Sodium 136 135 - 145 mmol/L    Potassium 3.7 3.4 - 5.3 mmol/L    Chloride 103 98 - 107 mmol/L    Carbon Dioxide (CO2) 26 22 - 29 mmol/L    Anion Gap 7 7 - 15 mmol/L    Urea Nitrogen 11.4 6.0 - 20.0 mg/dL    Creatinine 0.75 0.51 - 0.95 mg/dL    GFR Estimate >90 >60 mL/min/1.73m2    Calcium 9.2 8.6 - 10.0 mg/dL    Glucose 87 70 - 99 mg/dL   HCG quantitative pregnancy (blood)   Result Value Ref Range    hCG Quantitative 26,999 (H) <5 mIU/mL   UA with Microscopic reflex to Culture    Specimen: Urine, Clean Catch   Result Value Ref Range    Color Urine Light Yellow Colorless, Straw, Light Yellow, Yellow    Appearance Urine Clear Clear    Glucose Urine Negative Negative mg/dL    Bilirubin Urine Negative Negative    Ketones Urine Negative Negative mg/dL    Specific Gravity Urine 1.025 1.001 - 1.030    Blood Urine Negative Negative    pH Urine 6.5 5.0 - 7.0    Protein Albumin Urine Negative Negative mg/dL    Urobilinogen Urine <2.0 <2.0 mg/dL    Nitrite Urine Negative Negative    Leukocyte Esterase Urine Negative Negative    Mucus Urine Present (A) None Seen /LPF    RBC Urine 0 <=2 /HPF    WBC Urine <1 <=5 /HPF    Squamous Epithelials Urine <1 <=1 /HPF   Result Value Ref Range    Magnesium 2.1 1.7 - 2.3 mg/dL   CBC with  platelets and differential   Result Value Ref Range    WBC Count 10.9 4.0 - 11.0 10e3/uL    RBC Count 4.08 3.80 - 5.20 10e6/uL    Hemoglobin 11.0 (L) 11.7 - 15.7 g/dL    Hematocrit 34.5 (L) 35.0 - 47.0 %    MCV 85 78 - 100 fL    MCH 27.0 26.5 - 33.0 pg    MCHC 31.9 31.5 - 36.5 g/dL    RDW 13.7 10.0 - 15.0 %    Platelet Count 470 (H) 150 - 450 10e3/uL    % Neutrophils 61 %    % Lymphocytes 29 %    % Monocytes 7 %    % Eosinophils 1 %    % Basophils 1 %    % Immature Granulocytes 1 %    NRBCs per 100 WBC 0 <1 /100    Absolute Neutrophils 6.7 1.6 - 8.3 10e3/uL    Absolute Lymphocytes 3.2 0.8 - 5.3 10e3/uL    Absolute Monocytes 0.8 0.0 - 1.3 10e3/uL    Absolute Eosinophils 0.1 0.0 - 0.7 10e3/uL    Absolute Basophils 0.1 0.0 - 0.2 10e3/uL    Absolute Immature Granulocytes 0.1 <=0.4 10e3/uL    Absolute NRBCs 0.0 10e3/uL   ECG 12-LEAD WITH MUSE (LHE)   Result Value Ref Range    Systolic Blood Pressure  mmHg    Diastolic Blood Pressure  mmHg    Ventricular Rate 74 BPM    Atrial Rate 74 BPM    AR Interval 130 ms    QRS Duration 80 ms     ms    QTc 410 ms    P Axis 19 degrees    R AXIS 15 degrees    T Axis 7 degrees    Interpretation ECG       Sinus rhythm  Normal ECG  No previous ECGs available  Confirmed by SEE ED PROVIDER NOTE FOR, ECG INTERPRETATION ([id number]),  HUSSEIN CELAYA ([id number]) on 6/22/2024 5:30:04 PM     Adult Type and Screen   Result Value Ref Range    ABO/RH(D) O POS     Antibody Screen Negative Negative    SPECIMEN EXPIRATION DATE [id number]    Wet prep    Specimen: Vagina; Swab   Result Value Ref Range    Trichomonas Absent Absent    Yeast Absent Absent    Clue Cells Present (A) Absent    WBCs/high power field 1+ (A) None   Chlamydia trachomatis/Neisseria gonorrhoeae by PCR    Specimen: Vagina; Swab   Result Value Ref Range    Chlamydia Trachomatis Negative Negative    Neisseria gonorrhoeae Negative Negative         EKG:  Performed at: 1707 Impression: Normal sinus rhythm Rate: 74 bpm Rhythm:  Normal sinus rhythm  Axis: 15 WA Interval: 130 QRS Interval: 80 QTc Interval: 410 ST Changes: No significant ST elevation or depression/ No pathological Q waves. Comparison: None prior EKG results independently reviewed and interpreted by Dr. Chip Thomas, ED physician.       PROCEDURES:  None        I, Sariah Viera, am serving as a scribe to document services personally performed by Misa Callahan PA-C, based on my observation and the provider's statements to me. IMisa PA-C attest that Sariah Viera is acting in a scribe capacity, has observed my performance of the services and has documented them in accordance with my direction.         Misa Callahan PA-C  Emergency Medicine  [facility] EMERGENCY ROOM  [address] [address]  [phone]  Dept: [phone]     Misa Callahan PA-C  07/08/24 0216

## 2024-06-22 NOTE — ED TRIAGE NOTES
Patient arrives to the ER after being seen at the [facility] clinic for vaginal bleeding and cramping with pregnancy. Patient had a positive pregnancy test at home, but is unaware of how far along she is. She states that she has irregular periods, so she is unaware of how far along she could be. She has been experiencing cramping for the past few days. She states that the bleeding has stopped, but the cramps are still present. History of fibroids and a pelvic inflammatory infection as well as thrombosis. The clinic sent her to the ER to r/o a thrombus.  Also endorses intermittent left arm numbness.     Triage Assessment (Adult)       Row Name 06/22/24 1605          Triage Assessment    Airway WDL WDL        Respiratory WDL    Respiratory WDL X;rhythm/pattern     Rhythm/Pattern, Respiratory dyspnea upon exertion        Skin Circulation/Temperature WDL    Skin Circulation/Temperature WDL WDL        Cardiac WDL    Cardiac WDL WDL        Cognitive/Neuro/Behavioral WDL    Cognitive/Neuro/Behavioral WDL X  dizziness, feeling faint        Rafael Coma Scale    Best Eye Response 4-->(E4) spontaneous     Best Motor Response 6-->(M6) obeys commands     Best Verbal Response 5-->(V5) oriented     Rafael Coma Scale Score 15

## 2024-06-22 NOTE — ED PROVIDER NOTES
"Emergency Department [facility] Supervisory Note     I had a face to face encounter with this patient who was also seen by the ZEINA (PA / NP) who is currently serving as a ZEINA provider in the Emergency Department. I have seen, examined, and discussed the patient with the ZEINA and agree with their assessment and plan of management. Please refer to the ZEINA note for further details and ED course.     Brief HPI:     Aby Carmona is a 38 year old female who presents for evaluation of shortness of breath and lightheadedness that she experienced earlier today particularly while walking up some stairs.  Leading up to this she had some irregular vaginal bleeding over the last month and she was wondering if she was starting to go through menopause.    She took a home pregnancy test couple of days ago which returned positive.  She was seen by urgent care and referred here for further workup.    Her symptoms earlier today was transient and have largely resolved.    She never had any chest pain, syncope, leg swelling.  She was sent here from urgent care with a specific question of clots.  She apparently had some remote history of \"benign thrombophilia\" many years ago but she states that she has never had a blood clot of any type.                Brief Review of Systems:  Review of Systems   Constitutional:  Negative for fever.   Respiratory:  Positive for shortness of breath. Negative for cough.    Cardiovascular:  Negative for chest pain and leg swelling.   Gastrointestinal:  Negative for abdominal pain.   Skin:  Negative for rash.   Neurological:  Positive for light-headedness. Negative for syncope.   All other systems reviewed and are negative.         Brief Physical Exam:  Physical Exam  Vitals and nursing note reviewed.   HENT:      Head: Normocephalic.      Nose: Nose normal.   Cardiovascular:      Rate and Rhythm: Normal rate.   Pulmonary:      Effort: Pulmonary effort is normal.   Neurological:      Mental Status: She is alert. " Mental status is at baseline.   Psychiatric:         Mood and Affect: Mood normal.         ED Course/MDM:  Aby Carmona was staffed with me. I agree with their assessment and plan of management, and I will see the patient.  I met with the patient to introduce myself, gather additional history, perform my initial exam, and discuss the plan.       I did independently interpret EKG done today at 1707.  Normal sinus rhythm with a rate of 74 bpm.  Intervals and axis are normal.  No significant ST elevation or depression.  No pathological Q waves.  No previous for comparison.    Initial laboratory studies are returning reassuring.  Hemoglobin near baseline at 11.  She is positive on her beta hCG but ultrasound shows intrauterine gestation.    She does not have any signs or symptoms of DVT and she is not tachycardic, hypoxic or having chest pain.  I have fairly low suspicion for PE    She was ambulated with pulse oximetry and had no hypoxia or tachycardia    We will have shared decision making with risks and benefits of pursuing definitive PE rule out with her.      Given risks and benefits she elected to not pursue workup for PE and this does seem quite reasonable    Rh is positive and there is no indication for RhoGAM.  She will be discharged to follow-up with OB/GYN.  If anything worsens in the meantime she would return here    ED Course as of 06/23/24 1922   Sat Jun 22, 2024   1614 I met and introduced myself to the patient. I gathered initial history and performed an initial physical exam. We discussed options and plan for diagnostics and treatment here in the ED.   2059 Sodium: 136  Reassessed patient and updated her on results thus far.  Discussed plan of care.   2149 I did a pelvic exam with nurse chaperone present.  White physiologic discharge present in vaginal vault, no active vaginal bleeding.  Cervical os is closed.  No cervical motion tenderness.  Wet prep and GC chlamydia collected.       1. Lightheaded     2. Shortness of breath    3. Bacterial vaginosis        Labs and Imaging:  Results for orders placed or performed during the hospital encounter of 06/22/24   US OB <14 Weeks W Transvaginal    Impression    IMPRESSION:   1.  Single intrauterine gestational sac with estimated gestational age of 5 weeks 6 days. Heart activity is not detected, likely related to the early gestational age. Recommend repeat ultrasound examination in one week to confirm fetal viability.  2.  Presumed small corpus luteum cyst right ovary.  3.  Multiple uterine fibroids.       Basic metabolic panel   Result Value Ref Range    Sodium 136 135 - 145 mmol/L    Potassium 3.7 3.4 - 5.3 mmol/L    Chloride 103 98 - 107 mmol/L    Carbon Dioxide (CO2) 26 22 - 29 mmol/L    Anion Gap 7 7 - 15 mmol/L    Urea Nitrogen 11.4 6.0 - 20.0 mg/dL    Creatinine 0.75 0.51 - 0.95 mg/dL    GFR Estimate >90 >60 mL/min/1.73m2    Calcium 9.2 8.6 - 10.0 mg/dL    Glucose 87 70 - 99 mg/dL   HCG quantitative pregnancy (blood)   Result Value Ref Range    hCG Quantitative 26,999 (H) <5 mIU/mL   UA with Microscopic reflex to Culture    Specimen: Urine, Clean Catch   Result Value Ref Range    Color Urine Light Yellow Colorless, Straw, Light Yellow, Yellow    Appearance Urine Clear Clear    Glucose Urine Negative Negative mg/dL    Bilirubin Urine Negative Negative    Ketones Urine Negative Negative mg/dL    Specific Gravity Urine 1.025 1.001 - 1.030    Blood Urine Negative Negative    pH Urine 6.5 5.0 - 7.0    Protein Albumin Urine Negative Negative mg/dL    Urobilinogen Urine <2.0 <2.0 mg/dL    Nitrite Urine Negative Negative    Leukocyte Esterase Urine Negative Negative    Mucus Urine Present (A) None Seen /LPF    RBC Urine 0 <=2 /HPF    WBC Urine <1 <=5 /HPF    Squamous Epithelials Urine <1 <=1 /HPF   Result Value Ref Range    Magnesium 2.1 1.7 - 2.3 mg/dL   CBC with platelets and differential   Result Value Ref Range    WBC Count 10.9 4.0 - 11.0 10e3/uL    RBC Count 4.08  3.80 - 5.20 10e6/uL    Hemoglobin 11.0 (L) 11.7 - 15.7 g/dL    Hematocrit 34.5 (L) 35.0 - 47.0 %    MCV 85 78 - 100 fL    MCH 27.0 26.5 - 33.0 pg    MCHC 31.9 31.5 - 36.5 g/dL    RDW 13.7 10.0 - 15.0 %    Platelet Count 470 (H) 150 - 450 10e3/uL    % Neutrophils 61 %    % Lymphocytes 29 %    % Monocytes 7 %    % Eosinophils 1 %    % Basophils 1 %    % Immature Granulocytes 1 %    NRBCs per 100 WBC 0 <1 /100    Absolute Neutrophils 6.7 1.6 - 8.3 10e3/uL    Absolute Lymphocytes 3.2 0.8 - 5.3 10e3/uL    Absolute Monocytes 0.8 0.0 - 1.3 10e3/uL    Absolute Eosinophils 0.1 0.0 - 0.7 10e3/uL    Absolute Basophils 0.1 0.0 - 0.2 10e3/uL    Absolute Immature Granulocytes 0.1 <=0.4 10e3/uL    Absolute NRBCs 0.0 10e3/uL   ECG 12-LEAD WITH MUSE (LHE)   Result Value Ref Range    Systolic Blood Pressure  mmHg    Diastolic Blood Pressure  mmHg    Ventricular Rate 74 BPM    Atrial Rate 74 BPM    ND Interval 130 ms    QRS Duration 80 ms     ms    QTc 410 ms    P Axis 19 degrees    R AXIS 15 degrees    T Axis 7 degrees    Interpretation ECG       Sinus rhythm  Normal ECG  No previous ECGs available  Confirmed by SEE ED PROVIDER NOTE FOR, ECG INTERPRETATION ([id number]),  HUSSEIN CELAYA ([id number]) on 6/22/2024 5:30:04 PM     Adult Type and Screen   Result Value Ref Range    ABO/RH(D) O POS     Antibody Screen Negative Negative    SPECIMEN EXPIRATION DATE [id number]    Wet prep    Specimen: Vagina; Swab   Result Value Ref Range    Trichomonas Absent Absent    Yeast Absent Absent    Clue Cells Present (A) Absent    WBCs/high power field 1+ (A) None   Chlamydia trachomatis/Neisseria gonorrhoeae by PCR    Specimen: Vagina; Swab   Result Value Ref Range    Chlamydia Trachomatis Negative Negative    Neisseria gonorrhoeae Negative Negative     I have reviewed the relevant laboratory and radiology studies      Chip Thomas MD  [facility] EMERGENCY ROOM  [address]  [address]  [phone]       Chip Thomas MD  06/23/24 1924

## 2024-06-23 LAB
C TRACH DNA SPEC QL PROBE+SIG AMP: NEGATIVE
N GONORRHOEA DNA SPEC QL NAA+PROBE: NEGATIVE

## 2024-06-23 NOTE — DISCHARGE INSTRUCTIONS
[facility].  You tested positive for bacterial vaginosis.  Your ultrasound showed a pregnancy inside your uterus with estimated age 5 days 6 weeks.  Your beta hCG level is 26, 999.  Please follow-up with your primary care clinician within the next 48 to 72 hours for close recheck.  Please follow-up with OB/GYN within the next 48 to 72 hours for close recheck and consideration of repeat ultrasound in 1 week.  Please return to the emergency department if you develop any new, worsening, or concerning symptoms including but not limited to the following:     Call 911 anytime you think you may need emergency care. For example, call if:    You have severe shortness of breath.     You have symptoms of a heart attack. These may include:  Chest pain or pressure, or a strange feeling in the chest.  Sweating.  Shortness of breath.  Nausea or vomiting.  Pain, pressure, or a strange feeling in the back, neck, jaw, or upper belly or in one or both shoulders or arms.  Lightheadedness or sudden weakness.  A fast or irregular heartbeat.  After you call 911, the  may tell you to chew 1 adult-strength or 2 to 4 low-dose aspirin. Wait for an ambulance. Do not try to drive yourself.   Call your doctor now or seek immediate medical care if:    Your shortness of breath gets worse or you start to wheeze. Wheezing is a high-pitched sound when you breathe.     You wake up at night out of breath or have to prop your head up on several pillows to breathe.     You are short of breath after only light activity or while at rest.   Watch closely for changes in your health, and be sure to contact your doctor if:    You do not get better over the next 1 to 2 days.

## 2024-09-28 ENCOUNTER — HEALTH MAINTENANCE LETTER (OUTPATIENT)
Age: 39
End: 2024-09-28

## 2025-03-15 ENCOUNTER — HEALTH MAINTENANCE LETTER (OUTPATIENT)
Age: 40
End: 2025-03-15